# Patient Record
Sex: FEMALE | Race: BLACK OR AFRICAN AMERICAN | Employment: FULL TIME | URBAN - METROPOLITAN AREA
[De-identification: names, ages, dates, MRNs, and addresses within clinical notes are randomized per-mention and may not be internally consistent; named-entity substitution may affect disease eponyms.]

---

## 2019-01-29 ENCOUNTER — OP HISTORICAL/CONVERTED ENCOUNTER (OUTPATIENT)
Dept: OTHER | Age: 49
End: 2019-01-29

## 2020-03-20 ENCOUNTER — OP HISTORICAL/CONVERTED ENCOUNTER (OUTPATIENT)
Dept: OTHER | Age: 50
End: 2020-03-20

## 2020-03-30 ENCOUNTER — OP HISTORICAL/CONVERTED ENCOUNTER (OUTPATIENT)
Dept: OTHER | Age: 50
End: 2020-03-30

## 2022-05-17 ENCOUNTER — TRANSCRIBE ORDER (OUTPATIENT)
Dept: SCHEDULING | Age: 52
End: 2022-05-17

## 2022-05-17 DIAGNOSIS — Z12.31 SCREENING MAMMOGRAM FOR HIGH-RISK PATIENT: Primary | ICD-10-CM

## 2022-05-17 DIAGNOSIS — M67.441 GANGLION OF RIGHT HAND: Primary | ICD-10-CM

## 2022-05-17 DIAGNOSIS — L72.3 SEBACEOUS CYST: ICD-10-CM

## 2022-05-26 ENCOUNTER — HOSPITAL ENCOUNTER (OUTPATIENT)
Dept: MAMMOGRAPHY | Age: 52
Discharge: HOME | End: 2022-05-26
Attending: NURSE PRACTITIONER
Payer: COMMERCIAL

## 2022-05-26 DIAGNOSIS — M67.441 GANGLION OF RIGHT HAND: ICD-10-CM

## 2022-05-26 DIAGNOSIS — L72.3 SEBACEOUS CYST: ICD-10-CM

## 2022-05-26 DIAGNOSIS — Z12.31 SCREENING MAMMOGRAM FOR HIGH-RISK PATIENT: ICD-10-CM

## 2022-05-26 PROCEDURE — 76882 US LMTD JT/FCL EVL NVASC XTR: CPT

## 2022-05-26 PROCEDURE — 77063 BREAST TOMOSYNTHESIS BI: CPT

## 2023-10-23 ENCOUNTER — TRANSCRIBE ORDERS (OUTPATIENT)
Facility: HOSPITAL | Age: 53
End: 2023-10-23

## 2023-10-23 DIAGNOSIS — Z12.31 VISIT FOR SCREENING MAMMOGRAM: Primary | ICD-10-CM

## 2023-11-06 ENCOUNTER — HOSPITAL ENCOUNTER (OUTPATIENT)
Facility: HOSPITAL | Age: 53
Discharge: HOME | End: 2023-11-09
Payer: COMMERCIAL

## 2023-11-06 VITALS — HEIGHT: 64 IN | WEIGHT: 196 LBS | BODY MASS INDEX: 33.46 KG/M2

## 2023-11-06 DIAGNOSIS — Z12.31 VISIT FOR SCREENING MAMMOGRAM: ICD-10-CM

## 2023-11-06 PROCEDURE — 77063 BREAST TOMOSYNTHESIS BI: CPT

## 2024-03-27 ENCOUNTER — OFFICE VISIT (OUTPATIENT)
Age: 54
End: 2024-03-27
Payer: COMMERCIAL

## 2024-03-27 VITALS
HEART RATE: 69 BPM | DIASTOLIC BLOOD PRESSURE: 74 MMHG | BODY MASS INDEX: 33.36 KG/M2 | OXYGEN SATURATION: 97 % | WEIGHT: 195.4 LBS | HEIGHT: 64 IN | TEMPERATURE: 97.6 F | SYSTOLIC BLOOD PRESSURE: 137 MMHG

## 2024-03-27 DIAGNOSIS — B35.1 ONYCHOMYCOSIS: Primary | ICD-10-CM

## 2024-03-27 PROCEDURE — 99203 OFFICE O/P NEW LOW 30 MIN: CPT | Performed by: PODIATRIST

## 2024-03-27 RX ORDER — ATENOLOL 25 MG/1
25 TABLET ORAL DAILY
COMMUNITY

## 2024-03-27 RX ORDER — SIMVASTATIN 20 MG
20 TABLET ORAL NIGHTLY
COMMUNITY

## 2024-03-27 NOTE — PROGRESS NOTES
Chief Complaint   Patient presents with    New Patient    Nail Problem    /74 (Site: Left Upper Arm, Position: Sitting, Cuff Size: Large Adult)   Pulse 69   Temp 97.6 °F (36.4 °C) (Temporal)   Ht 1.626 m (5' 4\")   Wt 88.6 kg (195 lb 6.4 oz)   SpO2 97%   BMI 33.54 kg/m²  1. Have you been to the ER, urgent care clinic since your last visit?  Hospitalized since your last visit?No    2. Have you seen or consulted any other health care providers outside of the [facility] since your last visit?  Include any pap smears or colon screening. No

## 2024-04-01 ASSESSMENT — ENCOUNTER SYMPTOMS
ABDOMINAL PAIN: 0
VOMITING: 0
DIARRHEA: 0
SHORTNESS OF BREATH: 0

## 2024-04-01 NOTE — PROGRESS NOTES
[facility]         Patient Name: Vandana Banks    : 1970    Visit Date: 3/27/2024    Office Visit Note      Subjective:         Patient is a 53 y.o. female who is being seen in office initial visit for discoloration of bilateral toenails.  Patient states that she has had condition for over 3 months.    History reviewed. No pertinent past medical history.  No past surgical history on file.    Family History   Problem Relation Age of Onset    Diabetes Mother     Hypertension Mother     Diabetes Sister     Hypertension Sister       Social History     Tobacco Use    Smoking status: Unknown    Smokeless tobacco: Not on file   Substance Use Topics    Alcohol use: Not on file     No Known Allergies  Prior to Admission medications    Medication Sig Start Date End Date Taking? Authorizing Provider   sertraline (ZOLOFT) 50 MG tablet Take 1 tablet by mouth daily 21  Yes Ebenezer Morrell MD   ciclopirox (LOPROX) 0.77 % cream Apply topically 2 times daily to affected toenails. 3/27/24  Yes Jarocho Martínez DPM   atenolol (TENORMIN) 25 MG tablet Take 1 tablet by mouth daily    Ebenezer Morrell MD   vitamin D (CHOLECALCIFEROL) 25 MCG (1000 UT) TABS tablet Take 25 mcg by mouth daily    Ebenezer Morrell MD   simvastatin (ZOCOR) 20 MG tablet Take 1 tablet by mouth nightly    ProviderEbenezer MD       Review of Systems   Constitutional:  Negative for activity change, appetite change, chills, fatigue and fever.   HENT:  Negative for ear pain.    Respiratory:  Negative for shortness of breath.    Cardiovascular:  Negative for leg swelling.   Gastrointestinal:  Negative for abdominal pain, diarrhea and vomiting.   Neurological:  Negative for weakness.   Psychiatric/Behavioral:  Negative for behavioral problems. The patient is not nervous/anxious.           Objective:     Vitals:    24 1108   BP: 137/74   Pulse: 69   Temp: 97.6 °F (36.4 °C)   SpO2: 97%       GEN: Pt is AAOx4

## 2024-08-05 ENCOUNTER — TRANSCRIBE ORDERS (OUTPATIENT)
Dept: SCHEDULING | Age: 54
End: 2024-08-05

## 2024-08-05 DIAGNOSIS — Z12.31 ENCOUNTER FOR MAMMOGRAM TO ESTABLISH BASELINE MAMMOGRAM: Primary | ICD-10-CM

## 2024-11-11 ENCOUNTER — HOSPITAL ENCOUNTER (OUTPATIENT)
Facility: HOSPITAL | Age: 54
Discharge: HOME | End: 2024-11-14
Payer: COMMERCIAL

## 2024-11-11 DIAGNOSIS — Z12.31 ENCOUNTER FOR MAMMOGRAM TO ESTABLISH BASELINE MAMMOGRAM: ICD-10-CM

## 2024-11-11 PROCEDURE — 77067 SCR MAMMO BI INCL CAD: CPT
